# Patient Record
Sex: MALE | Race: WHITE
[De-identification: names, ages, dates, MRNs, and addresses within clinical notes are randomized per-mention and may not be internally consistent; named-entity substitution may affect disease eponyms.]

---

## 2017-01-23 ENCOUNTER — HOSPITAL ENCOUNTER (EMERGENCY)
Dept: HOSPITAL 17 - PHEFT | Age: 67
Discharge: HOME | End: 2017-01-23
Payer: COMMERCIAL

## 2017-01-23 VITALS
DIASTOLIC BLOOD PRESSURE: 55 MMHG | RESPIRATION RATE: 16 BRPM | TEMPERATURE: 98 F | OXYGEN SATURATION: 96 % | SYSTOLIC BLOOD PRESSURE: 99 MMHG | HEART RATE: 81 BPM

## 2017-01-23 VITALS — HEIGHT: 66 IN | WEIGHT: 258.16 LBS | BODY MASS INDEX: 41.49 KG/M2

## 2017-01-23 VITALS — RESPIRATION RATE: 18 BRPM

## 2017-01-23 DIAGNOSIS — S80.11XA: Primary | ICD-10-CM
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
## 2017-01-23 DIAGNOSIS — S83.8X2A: ICD-10-CM
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
## 2017-01-23 DIAGNOSIS — S29.011A: ICD-10-CM
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
## 2017-01-23 PROCEDURE — 99284 EMERGENCY DEPT VISIT MOD MDM: CPT

## 2017-01-23 PROCEDURE — 90471 IMMUNIZATION ADMIN: CPT

## 2017-01-23 PROCEDURE — 73560 X-RAY EXAM OF KNEE 1 OR 2: CPT

## 2017-01-23 PROCEDURE — 71010: CPT

## 2017-01-23 PROCEDURE — 90714 TD VACC NO PRESV 7 YRS+ IM: CPT

## 2017-01-23 PROCEDURE — 73590 X-RAY EXAM OF LOWER LEG: CPT

## 2017-01-23 PROCEDURE — 96372 THER/PROPH/DIAG INJ SC/IM: CPT
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
## 2018-06-05 ENCOUNTER — HOSPITAL ENCOUNTER (OUTPATIENT)
Dept: HOSPITAL 17 - HSDC | Age: 68
Discharge: HOME | End: 2018-06-05
Attending: INTERNAL MEDICINE
Payer: MEDICARE

## 2018-06-05 DIAGNOSIS — I34.0: ICD-10-CM

## 2018-06-05 DIAGNOSIS — I35.1: ICD-10-CM

## 2018-06-05 DIAGNOSIS — I10: ICD-10-CM

## 2018-06-05 DIAGNOSIS — E66.9: ICD-10-CM

## 2018-06-05 DIAGNOSIS — I25.119: ICD-10-CM

## 2018-06-05 DIAGNOSIS — E10.9: ICD-10-CM

## 2018-06-05 DIAGNOSIS — R01.1: Primary | ICD-10-CM

## 2018-06-05 PROCEDURE — 93325 DOPPLER ECHO COLOR FLOW MAPG: CPT

## 2018-06-05 PROCEDURE — 93320 DOPPLER ECHO COMPLETE: CPT

## 2018-06-05 PROCEDURE — 93312 ECHO TRANSESOPHAGEAL: CPT

## 2018-06-05 PROCEDURE — 93005 ELECTROCARDIOGRAM TRACING: CPT

## 2018-06-05 NOTE — CF
cc:

Sampson Warner MD

****

 

 

DATE:

06/05/2018

 

INDICATIONS FOR PROCEDURE:

The patient is having a loud murmur was a technically difficult 

transthoracic echocardiogram.

 

PROCEDURE:

Transesophageal echocardiogram.

 

PROCEDURE:

After obtaining informed consent, the patient was stable to the 

recovery area.  Anesthesia Department took care of sedation.

 

Transesophageal echocardiogram was performed and no complications.

 

FINDINGS:

Aortic valve well visualized, significantly calcified.  Could not get 

pressure gradients across it.  The half planimetry showed about 1.3 

cm2 for valve area.  No shunts, no vegetations.

 

IMPRESSION:

1.  Moderate aortic insufficiency.

2.  Mild mitral insufficiency.

3.  No shunts, no vegetations and no effusion.

 

 

__________________________________

Sampson Warner MD

 

 

[facility]

D: 06/05/2018, 10:20 AM

T: 06/05/2018, 10:39 AM

Visit #: [id number]

Job #: [id number]

## 2018-06-05 NOTE — EKG
Date Performed: 06/05/2018       Time Performed: 08:32:34

 

PTAGE:      68 years

 

EKG:      Sinus rhythm 

 

 with 1st degree A-V block. Lead(s) unsuitable for analysis: V1 Left anterior fascicular block Left v
entricular hypertrophy Lateral ST-T changes are probably due to ventricular hypertrophy Abnormal ECG 


 

NO PREVIOUS TRACING            

 

DOCTOR:   Maylin Porras  Interpretating Date/Time  06/05/2018 15:25:48

## 2018-06-25 ENCOUNTER — HOSPITAL ENCOUNTER (OUTPATIENT)
Dept: HOSPITAL 17 - HDOC | Age: 68
Discharge: HOME | End: 2018-06-25
Attending: INTERNAL MEDICINE
Payer: MEDICARE

## 2018-06-25 VITALS — WEIGHT: 257.72 LBS | BODY MASS INDEX: 42.94 KG/M2 | HEIGHT: 65 IN

## 2018-06-25 VITALS
RESPIRATION RATE: 18 BRPM | DIASTOLIC BLOOD PRESSURE: 86 MMHG | SYSTOLIC BLOOD PRESSURE: 166 MMHG | OXYGEN SATURATION: 97 % | HEART RATE: 84 BPM | TEMPERATURE: 97.6 F

## 2018-06-25 DIAGNOSIS — E10.9: ICD-10-CM

## 2018-06-25 DIAGNOSIS — I10: ICD-10-CM

## 2018-06-25 DIAGNOSIS — E66.9: ICD-10-CM

## 2018-06-25 DIAGNOSIS — I35.0: Primary | ICD-10-CM

## 2018-06-25 DIAGNOSIS — R01.1: ICD-10-CM

## 2018-06-25 DIAGNOSIS — Z79.84: ICD-10-CM

## 2018-06-25 LAB
BASOPHILS # BLD AUTO: 0.1 TH/MM3 (ref 0–0.2)
BASOPHILS NFR BLD: 1 % (ref 0–2)
BUN SERPL-MCNC: 21 MG/DL (ref 7–18)
CALCIUM SERPL-MCNC: 8.9 MG/DL (ref 8.5–10.1)
CHLORIDE SERPL-SCNC: 105 MEQ/L (ref 98–107)
CREAT SERPL-MCNC: 1.09 MG/DL (ref 0.6–1.3)
EOSINOPHIL # BLD: 0.2 TH/MM3 (ref 0–0.4)
EOSINOPHIL NFR BLD: 3.9 % (ref 0–4)
ERYTHROCYTE [DISTWIDTH] IN BLOOD BY AUTOMATED COUNT: 15.1 % (ref 11.6–17.2)
GFR SERPLBLD BASED ON 1.73 SQ M-ARVRAT: 67 ML/MIN (ref 89–?)
GLUCOSE SERPL-MCNC: 118 MG/DL (ref 74–106)
HCO3 BLD-SCNC: 24.2 MEQ/L (ref 21–32)
HCT VFR BLD CALC: 39.7 % (ref 39–51)
HGB BLD-MCNC: 13.2 GM/DL (ref 13–17)
INR PPP: 1 RATIO
LYMPHOCYTES # BLD AUTO: 1.9 TH/MM3 (ref 1–4.8)
LYMPHOCYTES NFR BLD AUTO: 31.6 % (ref 9–44)
MCH RBC QN AUTO: 27.9 PG (ref 27–34)
MCHC RBC AUTO-ENTMCNC: 33.2 % (ref 32–36)
MCV RBC AUTO: 84.1 FL (ref 80–100)
MONOCYTE #: 0.5 TH/MM3 (ref 0–0.9)
MONOCYTES NFR BLD: 8.1 % (ref 0–8)
NEUTROPHILS # BLD AUTO: 3.4 TH/MM3 (ref 1.8–7.7)
NEUTROPHILS NFR BLD AUTO: 55.4 % (ref 16–70)
PLATELET # BLD: 225 TH/MM3 (ref 150–450)
PMV BLD AUTO: 8.4 FL (ref 7–11)
PROTHROMBIN TIME: 10.5 SEC (ref 9.8–11.6)
RBC # BLD AUTO: 4.73 MIL/MM3 (ref 4.5–5.9)
SODIUM SERPL-SCNC: 140 MEQ/L (ref 136–145)
WBC # BLD AUTO: 6.1 TH/MM3 (ref 4–11)

## 2018-06-25 PROCEDURE — 99153 MOD SED SAME PHYS/QHP EA: CPT

## 2018-06-25 PROCEDURE — C1760 CLOSURE DEV, VASC: HCPCS

## 2018-06-25 PROCEDURE — 99152 MOD SED SAME PHYS/QHP 5/>YRS: CPT

## 2018-06-25 PROCEDURE — 82810 BLOOD GASES O2 SAT ONLY: CPT

## 2018-06-25 PROCEDURE — 80048 BASIC METABOLIC PNL TOTAL CA: CPT

## 2018-06-25 PROCEDURE — 93005 ELECTROCARDIOGRAM TRACING: CPT

## 2018-06-25 PROCEDURE — G0269 OCCLUSIVE DEVICE IN VEIN ART: HCPCS

## 2018-06-25 PROCEDURE — C1769 GUIDE WIRE: HCPCS

## 2018-06-25 PROCEDURE — 85730 THROMBOPLASTIN TIME PARTIAL: CPT

## 2018-06-25 PROCEDURE — 85025 COMPLETE CBC W/AUTO DIFF WBC: CPT

## 2018-06-25 PROCEDURE — C1893 INTRO/SHEATH, FIXED,NON-PEEL: HCPCS

## 2018-06-25 PROCEDURE — 85610 PROTHROMBIN TIME: CPT

## 2018-06-25 PROCEDURE — 93460 R&L HRT ART/VENTRICLE ANGIO: CPT

## 2018-06-25 NOTE — EKG
Date Performed: 06/25/2018       Time Performed: 10:31:46

 

PTAGE:      68 years

 

EKG:      Sinus rhythm 

 

 with 1st degree A-V block. Left anterior fascicular block Left ventricular hypertrophy Lateral ST-T 
changes are probably due to ventricular hypertrophy Abnormal ECG Compared to prior electrocardiogram,
 I see no definite changes although the prior EKG has marked artifact. 

 

 PREVIOUS TRACING            : 06/05/2018 08.32

 

DOCTOR:   Ian Atkins  Interpretating Date/Time  06/25/2018 15:26:12

## 2018-06-25 NOTE — MP
cc:

Sampson Warner MD

****

 

 

DATE OF OPERATION:

06/25/2018

 

INDICATIONS:

Possible aortic stenosis.

 

PROCEDURE:

Right and left heart catheterization.

 

DESCRIPTION OF PROCEDURE:

After obtaining informed consent, the patient in the fasting state was

brought to the cath lab.  The right groin was sterilized and draped 

with sterile drapes.  1% Xylocaine used to locally anesthetize the 

area.  A 6-[place] sheath was used to access the right femoral artery, 

7-[place] sheath right femoral vein.  [facility] to perform right heart 

pressure and cardiac outputs and JL4 to intubate the left main, JR4 to

intubate the right coronary artery.  With the help of AL1 stiff wire, 

we managed to get a Wilder catheter for simultaneous pressure across

the aortic valve done successfully and left ventriculogram was 

performed.  At the end of the procedure, a femoral angiogram was 

performed and VASCADE closure of the puncture wound done successfully.

 After taking the sheath out, he was sent back to his room in stable 

condition with no complications.

 

RESULTS:

CORONARY ANGIOGRAM:

The left main coronary artery is a medium size vessel that bifurcates 

in LAD and left circumflex coronary artery.  The left main has no 

significant disease.

 

The left circumflex artery and its branches with no significant 

disease.

 

The right coronary artery is dominant with no significant disease.

 

The following were the pressures obtained.   The right heart 

pressures, wedge pressure of mean of 13.  Pulmonary artery pressure 

34/10 and a right ventricular pressure 37/14,  right atrial pressure 

mean of 16.

 

Cardiac output was done by 2 methods.  Cardiac output by 

thermodilution 5.1.  Cardiac output by the [place] was 6.1.

 

Mean pressure gradient across the aortic valve was 55.1 with a valve 

area 0.63 using thermodilution with index 0.29 and by [place] valve area 

calculated to be 0.75 with index of 0.34.

 

He tolerated the procedure well and was sent back room in stable 

condition.

 

POSTOPERATIVE DIAGNOSES:

1.  Severe aortic stenosis.

 2.  No significant coronary artery disease.

 

LEFT VENTRICULOGRAM:

The left ventriculogram was done; however, it was not conclusive due 

to non-filling of the left ventricle.

 

 

__________________________________

Taher A. WarnerMD ANA meade

D: 06/25/2018, 12:59 PM

T: 06/25/2018, 01:16 PM

Visit #: [id number]

Job #: [id number]

## 2018-06-25 NOTE — CATHPROC
[facility] HIS Report

Study Information

Study Number    Admission           Scheduled Start            Study Start

 

[id number]    Jun 25 2018 9:45AM      06/25/2018 Jun 25 2018 11:36AM

 

Universal Service

 

Cardiac Catheterization

 

Admit Source               Facility Department

 

Other                   [facility] - Cath Lab

 

Physician and Clinical Staff

Initial Sampson Tovar           Circulator     Deborah Tena RN

 

                         Recorder      Megan Son,RT(R) (BS)

 

                         Scrub        Domenica Cuevas,RT(R) TECH2

 

Procedures Performed

Procedure                     Location (Site)           Vessel Name

 

Coronary Angiograms                 LCA                Left Coronary

Coronary Angiograms                 RCA                Right Coronary



LV Gram-hand inj. LV               LV                 Ventricle

Wire insertion                   Fem Art (right)          Femoral Art

Equipment

Time         Description           Size      Mfg Part Number  Used/Scraped

                                         [id number]

11:51   BAEZA SANCHEZ     SWAN YAYO CATHETER        FR 7               Used

                                         *[id number]

                 TRANSDUCER, TRUWAVE               [id number]

11:51   BAEZA SANCHEZ                      *                Used

                 W/STOCKCOCK                   *[id number]

                 TRANSDUCER, TRUWAVE               [id number]

11:51   BAEZA SANCHEZ                      *                Used

                 W/STOCKCOCK                   *[id number]

                 WIRE, ZIP STRAIGHT GLIDE            [id number]

12:40   [employer]                    150CM              Used

                 150CM                      *[id number]

                                         [id number]

12:53   [employer]     VASCADE, FR6 CLOSURE SYSTEM FR 6\7                Used

                                         *[id number]

                                         [id number]



                                         *[id number]

                                         [id number]



                                         *[id number]

                                         [id number]



                                         *[id number]

                                         [id number]

11:51   [employer]   BLANKET,WARM AIR CCL       *                Used

                                         *[id number]

                                         [id number]

11:51   [employer]   PACK, CCL CUSTOM         *                Used

                                         *[id number]

                                         [id number]

11:51   [employer]      PEN, SKIN DUAL W/ RULER     *                Used

                                         *[id number]

                                         [id number]

11:51   [employer]      SHEATH, FR6.5 PRELUDE 11CM    FR 6.5     038ACT      Used

                                         *[id number]

                                         [id number]

11:51   [employer]      WIRE, 3MMJ .035 180CM      180CM              Used

                                         *[id number]

                                         [id number]

12:47   [employer]      WIRE, EXCHANGE 260CM 3MMJ    260CM              Used

                                         *[id number]

                                         [id number]

12:44   [employer]      WIRE, STRAIGHT TIP .035     *                Used

                                         *[id number]

                                         [id number]

11:51   [employer]          MANIFOLD, 2 PORT         *                Used

                                         *[id number]

                                         [id number]

11:51   [employer]          MANIFOLD, 4 PORT         *                Used

                                         *[id number]

11:51   [employer]         OMNIPAQUE, 350 MG, 150ML     150ML     [id number]      Used

                                         [id number]

11:51   [employer]     SHEATH, FR7 TERUMO (10CM)    FR 7               Used

                                         *[id number]

12:18   [employer]   PIGTAIL DUAL LUMEN CATHETER   FR 6      5574 *[id number]   Used

 

Equipment Model, Serial, Lot Number and Expiration Data

Description            Model Number       Serial Number  Lot Number       Expiration Date

 

WIRE, ZIP STRAIGHT GLIDE 150CM                       [id number]        04-

 

 

History: Allergies

Allergy            Reaction

 

No Known Allergies

History: Risk Factors

                      Family History of

Hypertension    Dyslipidemia                   Previous MI    Previous Heart Failure

                      Premature CAD

Yes         No           No            No         No

Prior Valve

          Prior PCI        Prior CABG

Surgery

No         No           No

          Cerebrovascular     Peripheral Artery     Chronic Lung

On Dialysis                                       Diabetes   Diabetes Therapy

          Disease         Disease          Disease

No         No           No            No         Yes      Oral

 

 

History: Stress Tests

Stress or Imaging Studies Performed

 

Yes

Standard Exercise Stress

Test

No

 

Stress Echo

 

No

 

Stress Test SPECT        Stress Test SPECT Result

 

Yes               Positive

 

Stress Test CMR

 

No

 

Cardiac CTA           Coronary Calcium Score

 

No               No

 

 

History: Other

Current Smoker

 

No

 

Labs

Hgb (g/dl)        Hct (%)         WBC (l/cumm)        Platelets (thousands)

 

11.60-17.00       35.00-51.00       4.00-11.00         150..00

 

13.0           39           6.1             225

 

Glucose (mg/dl)     BUN (mg/dl)       Creatinine (mg/dl)    BUN:Creatinine (1:x)

74..00       7.00-18.00       0.50-1.30         10.00-20.00

 

118           21           1.0            21

 

Na (meq/l)        K (meq/l)

 

136..00      3.50-5.10

 

140           4.3

 

INR (PTT:PT)

 

0.90-1.10

 

1

 

CPK-MB (ng/ML)

 

0.50-3.60

 

Not Drawn

Medication

Medication Total Dose (Bolus/Oral)

Medication              Total Dosage/Unit

 

1% XYLOCAINE                20 mL

 

VERSED                    2 mg

 

Medications (Bolus/Oral)

Medication          Time Given           Dosage/Unit       Administered By      Reason

 

VERSED            6/25/2018 12:13:25 PM      1 mg         Deborah Tena

1 mg VERSED given in lab by Deborah Tena, RN in Left Antecubital via Peripheral IV.

 

1% XYLOCAINE         6/25/2018 12:23:01 PM      20 mL         Sampson Warner

20 mL 1% XYLOCAINE given in lab by Sampson Warner in Right Groin via Subcutaneous.

 

VERSED            6/25/2018 12:23:17 PM      1 mg         Deborah Tena

1 mg VERSED given in lab by Deborah Tena RN in Left Antecubital via Peripheral IV.

 

Medication (Drip)

Medication          Time Given           Dosage/Unit      Concentration/Unit  Diluent (ml)   Solution


 

IV Solutions         6/25/2018 12:04:30 PM      0 mL (IV)                 500       NaCl .9

IV Solutions given in lab by Deborah Tena RN in Left Antecubital via Peripheral IV. Pump/Drip Fl
ow = 30 ml/hr using NaCl .9.

Initial Case Assessment

Cardiovascular

HR           Rhythm          NIBP          Chest Pain

 

73           reg            150/81         0

 

Edema Present      Skin color           Skin

 

None           Normal             Warm Dry

 

Circulatory - Right Pulses

Dorsalis Pedis     Femoral

 

2            1

 

Scale (0,1,2,3,4,d)

 

Circulatory - Left Pulses

Dorsalis Pedis     Femoral

 

2            1

 

Scale (0,1,2,3,4,d)

 

Circulatory - Lower Extremities

Color Lower Right    Color Lower Left

 

 

Normal         Normal

 

Neurological State

            Oriented to time-place-

Alert                      Moves all extremities

            person

 

Respiration - General

Respiration Rate

            SpO2 (%)

(B/min)

13           97

 

Chronological Log

Time    Study Chronological Log

 

12:02:51  Patient arrived via Bed.

 

12:02:52  Patient Name, D.O.B, / Armband Verified By R.N.

 

12:02:53  Consent signed by the physician and the patient and verified by the Cath Lab staff.

      Vitals capture started with the following parameters, Patient=Adult, Interval=5 min, Initial Pr
zuvnro=458 mmHg,

12:02:59

      Deflation Rate=5 mmHg, Cuff placed on Right Ankle

12:04:10  Pre-op and post- op instructions given; patient acknowledges understanding of instructions.


 

12:04:11  Verbal Stimulation=2 Physical Stimulation=2 Airway=2 Respiration=2 TOTAL=8. (0=absent, 1=li
mited, 2=present)

 

12:04:12  Presedation assessment performed by Cath Lab RN.

 

12:04:23  Patient has been NPO for More than 6Hrs.

 

12:04:24  Skin Breakdown none per pt

 

12:04:27  Patient Warmer Placed on the Table.

 

12:04:29  Martell Prominences Protected

 

12:04:30  A # 20 IV was noted in the Antecubital (left). Grade = 0

      IV Solutions given in lab by Deborah Tena, RN in Left Antecubital via Peripheral IV. Pump/D
rip Flow = 30 ml/hr

12:04:30

      using NaCl .9.

12:04:33  History and physical on the chart or being dictated.

      Assessment: Initial Case, HR=73 BPM, Rhythm=reg, BVQX=384/81 mmhg, Chest Pain=0, Edema=None, Co
estella=Normal,

      Skin = Warm, Dry

      Right Pulses: Mario Ped=2, Femoral=1

      Left Pulses: Mario Ped=2, Femoral=1

12:04:34

      Lower Right Extremities: Color=Normal

      Lower Left Extremities: Color=Normal

      Neurological: State=Alert, Ox3, MA

      Respiration: Resp=13 B/min, SpO2=97 %

12:04:58  HR=89 bpm, TAIM=978/81 mmhg, SpO2=97.0 %, Resp=10 B/min, Pain=0, Thierry=10, Elmore=2

 

12:07:05  Reference ECG taken

 

12:08:41  HR=82 bpm, XYNB=711/80 mmhg, SpO2=97.0 %, Resp=21 B/min, Pain=0, Thierry=10, Elmore=2

 

12:10:09  MD arrived.

 

12:13:25  1 mg VERSED given in lab by Deborah Tena, RN in Left Antecubital via Peripheral IV.

 

12:13:42  HR=87 bpm, ZCGY=910/75 mmhg, SpO2=97.0 %, Resp=13 B/min, Pain=0, Thierry=10, Elmore=2

 

12:16:55  Bilateral groins prepped with 2% chlorhexidine, and draped after a 3 minute waiting time.

 

12:18:41  HR=79 bpm, TYMZ=636/80 mmhg, SpO2=95.0 %, Resp=9 B/min, Pain=0, Thierry=10, Elmore=2

      Time Out. Correct patient, correct procedure, correct physician, labs, allergies, and equipment
 verified with cath lab

12:22:08  team present. Fire risk assesment completed (see hard stop sheet for coding). Time Out Conc
urred by MD and

      individual staff in procedure.

12:22:20  Case Start

 

12:23:01  20 mL 1% XYLOCAINE given in lab by Sampson Warner in Right Groin via Subcutaneous.

 

12:23:10  Pressure channel 1 zeroed.

 

12:23:17  1 mg VERSED given in lab by Deborah Tena RN in Left Antecubital via Peripheral IV.

 

12:23:42  HR=77 bpm, OETY=747/77 mmhg, SpO2=95.0 %, Resp=15 B/min, Pain=0, Thierry=10, Elmore=2

 

12:24:16  Pressure channel 2 zero failed.

 

12:24:34  Pressure channel 1 zeroed.

 

12:24:46  Pressure channel 2 zeroed.

 

12:24:56  Access site was Right Femoral Artery.

 

12:25:03  A SHEATH, FR6.5 PRELUDE 11CM FR 6.5 was advanced into the Fem Art (right) using the Percuta
neous technique.

 

12:25:18  Access site was Right Femoral Vein.

 

12:25:23  A SHEATH, FR7 TERUMO (10CM) FR 7 was advanced into the Fem Vein (right) using the Percutane
ous technique.

 

12:25:50  A SWAN YAYO CATHETER FR 7 was inserted via Fem Vein (right)

      Recorded Pressure: PCW, HR=77, Condition=Condition 1

12:27:53

      (Pulmonary Capillary Wedge) PCW 18/13/10

      Recorded Pressure: MPA, HR=67, Condition=Condition 1

12:28:07

      (Main Pulmonary Artery) MPA 34/10/22

12:28:26  Saturation: Site=FA (Femoral Artery) , O2=93.1 %, Hgb=13 gm/dl, Condition=Condition 1. Used
 in calculation.

 

12:28:41  HR=72 bpm, WPKA=177/72 mmhg, SpO2=93.0 %, Resp=26 B/min, Pain=0, Thierry=10, Elmore=2

 

12:29:07  Saturation: Site=PA (Pulmonary Artery) , O2=67.4 %, Hgb=13 gm/dl, Condition=Condition 1. Us
ed in calculation.

      Thermo CO: CO=5.2 l/m, HR=76 bpm, Condition=Condition 1. Used in calculation.

12:30:35  Equipment: Description and Size=SWAN YAYO CATHETER FR 7, Type=Bath Probe, CC=0.579

      Injectant: Temp=19.0 - 22.0 Celsius, Volume=10.0 ml

      Thermo CO: CO=4.9 l/m, HR=66 bpm, Condition=Condition 1. Used in calculation.

12:31:31  Equipment: Description and Size=SWAN YAYO CATHETER FR 7, Type=Bath Probe, CC=0.579

      Injectant: Temp=19.0 - 22.0 Celsius, Volume=10.0 ml

      Thermo CO: CO=5.3 l/m, HR=67 bpm, Condition=Condition 1. Used in calculation.

12:32:18  Equipment: Description and Size=SWAN YAYO CATHETER FR 7, Type=Bath Probe, CC=0.579

      Injectant: Temp=19.0 - 22.0 Celsius, Volume=10.0 ml

      Recorded Pressure: RV, NI=637, Condition=Condition 1

12:32:53

      (Right Ventricle) RV 37/14/18

      Recorded Pressure: RA, HR=58, Condition=Condition 1

12:33:22

      (Right Atrium) RA 16/16/11

12:33:32  Gate Yayo Catheter Removed

 

12:33:43  HR=58 bpm, MECQ=598/67 mmhg, SpO2=94.0 %, Resp=11 B/min, Pain=0, Thierry=10, Elmore=2

      A JL 4.0 INFINITI CATHETER FR 6 was advanced over a wire. OMNIPAQUE, 350 MG, 150ML 150ML was us
ed for

12:34:27

      injections.

12:35:17  The  LCA was injected and visualized at various angles. OMNIPAQUE, 350 MG, 150ML 150ML used
.

      Recorded Pressure: Ao, HR=65, Condition=Condition 1

12:35:32

      (Aorta) Ao 111/57/77

12:35:43  Catheter was removed

      A JR 4.0 INFINITI CATHETER FR 6 was advanced over a wire. OMNIPAQUE, 350 MG, 150ML 150ML was us
ed for

12:35:44

      injections.

12:38:28  The  RCA was injected and visualized at various angles. OMNIPAQUE, 350 MG, 150ML 150ML used
.

 

12:38:40  HR=77 bpm, DYKI=673/71 mmhg, SpO2=93.0 %, Resp=24 B/min, Pain=0, Thierry=10, Elmore=2

 

12:39:44  Catheter was removed

      A AL 1 INFINITI CATHETER FR 5 was advanced over a wire. OMNIPAQUE, 350 MG, 150ML 150ML was used
 for

12:39:49

      injections.

12:40:20  A WIRE, ZIP STRAIGHT GLIDE 150CM 150CM was inserted via Fem Art (right).

 

12:43:41  HR=76 bpm, KUIF=104/64 mmhg, SpO2=93.0 %, Resp=14 B/min, Pain=0, Thierry=10, Elmore=2

 

12:44:17  Wire removed

 

12:44:19  A WIRE, STRAIGHT TIP .035 * was inserted via Fem Art (right).

 

12:45:26  Wire removed

 

12:47:03  A WIRE, EXCHANGE 260CM 3MMJ 260CM was inserted via Fem Art (right).

      After removing the current catheter a PIGTAIL DUAL LUMEN CATHETER FR 6 was advanced over a WIRE
, EXCHANGE

12:47:29

      260CM 3MMJ 260CM.

12:48:42  HR=71 bpm, BOKJ=969/63 mmhg, SpO2=95.0 %, Resp=24 B/min, Pain=0, Thierry=10, Elmore=2

 

12:49:16  Wire removed

      Recorded Pressure: LV, HR=79, Condition=Condition 1

12:49:51

      (Left Ventricle) /8/31

      Recorded Pressure: Ao, LV, HR=77, Condition=Condition 1

12:50:24  (Aorta) Ao 105/58/79,

      (Left Ventricle) /11/36

12:50:58  The LV was manually injected with 8 cc's and visualized. OMNIPAQUE, 350 MG, 150ML 150ML use
d.

 

12:51:11  Catheter was removed

 

12:51:26  An injection in the Fem Art (right) was made through the SHEATH, FR6.5 PRELUDE 11CM FR 6.5.


 

12:51:56  VASCADE, FR6 CLOSURE SYSTEM FR 6\7 placement in the Fem Art (right)

 

12:53:55  Case End (Physician broke scrub)

 

12:54:13  HR=79 bpm, PGRQ=475/72 mmhg, SpO2=94.0 %, Resp=14 B/min, Pain=0, Thierry=10, Elmore=2

 

12:58:44  HR=75 bpm, LILE=703/70 mmhg, SpO2=95.0 %, Resp=15 B/min, Pain=0, Thierry=10, Elmore=2

 

13:01:24  Venous sheath removed; pressure applied to access site.

 

13:03:43  HR=71 bpm, BBUH=945/75 mmhg, SpO2=95.0 %, Resp=17 B/min, Pain=0, Thierry=10, Elmore=2

 

13:08:46  HR=77 bpm, LSWF=538/71 mmhg, SpO2=94.0 %, Resp=22 B/min, Pain=0, Thierry=10, Elmore=2

 

13:13:47  HR=77 bpm, XAMD=564/70 mmhg, SpO2=95.0 %, Resp=12 B/min, Pain=0, Thierry=10, Elmore=2

 

13:16:53  Catheter(s) removed without difficulty

13:16:56   Sterile dressing applied to site

 

13:17:04   No case complications noted.

 

13:17:10   Bedside Report will be given.

 

13:17:14   A Left and Right Heart Cath was performed.

 

13:18:36   XWRP=915/69 mmhg, Pain=0, Thierry=10, Elmore=2

 

13:18:43   Vitals capture stopped.

 

13:20:16   [facility]

 

 

 

 

End Study - Contrast Media Used In Study

Contrast        Total Opened (mL)     Total Used (mL)     Total Wasted (mL)

 

Omnipaque       60            60            0

 

End Study - Maximum Contrast Load

Max Contrast Load (mL)

 

584.5

 

End Study - Radiation Exposure

Fluoro Time

(minutes)

9.7

 

End Study - Sheaths

Sheaths Pulled By                Sheath Hold Time (min)

 

Domenica Cuevas                 15

 

 

End Study - Patient Disposition

Complications     Transferred To

 

No           Cath Lab Holding
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
